# Patient Record
(demographics unavailable — no encounter records)

---

## 2025-05-06 NOTE — HISTORY OF PRESENT ILLNESS
[FreeTextEntry1] : ION ZARCO ,30 YO, Presents for delayed mense Complains about vaginal itching, yellow discharge x 3 weeks.  OBHX:  x 1 GYNHX :Denies LMP: 10/18/24 irregular Mense's No medication Last pap unsure Never had a Mammo